# Patient Record
Sex: FEMALE | Race: WHITE | NOT HISPANIC OR LATINO | Employment: OTHER | URBAN - METROPOLITAN AREA
[De-identification: names, ages, dates, MRNs, and addresses within clinical notes are randomized per-mention and may not be internally consistent; named-entity substitution may affect disease eponyms.]

---

## 2023-08-02 ENCOUNTER — OFFICE VISIT (OUTPATIENT)
Dept: PRIMARY CARE CLINIC | Facility: CLINIC | Age: 83
End: 2023-08-02
Payer: MEDICARE

## 2023-08-02 VITALS
HEIGHT: 65 IN | WEIGHT: 159 LBS | OXYGEN SATURATION: 98 % | HEART RATE: 76 BPM | SYSTOLIC BLOOD PRESSURE: 110 MMHG | BODY MASS INDEX: 26.49 KG/M2 | TEMPERATURE: 98 F | RESPIRATION RATE: 18 BRPM | DIASTOLIC BLOOD PRESSURE: 70 MMHG

## 2023-08-02 DIAGNOSIS — Z85.3 HISTORY OF RIGHT BREAST CANCER: ICD-10-CM

## 2023-08-02 DIAGNOSIS — L72.0 EPIDERMAL INCLUSION CYST: ICD-10-CM

## 2023-08-02 DIAGNOSIS — Z76.89 ENCOUNTER TO ESTABLISH CARE WITH NEW DOCTOR: Primary | ICD-10-CM

## 2023-08-02 DIAGNOSIS — N32.81 OAB (OVERACTIVE BLADDER): ICD-10-CM

## 2023-08-02 PROCEDURE — 99204 PR OFFICE/OUTPT VISIT, NEW, LEVL IV, 45-59 MIN: ICD-10-PCS | Mod: ,,, | Performed by: STUDENT IN AN ORGANIZED HEALTH CARE EDUCATION/TRAINING PROGRAM

## 2023-08-02 PROCEDURE — 99204 OFFICE O/P NEW MOD 45 MIN: CPT | Mod: ,,, | Performed by: STUDENT IN AN ORGANIZED HEALTH CARE EDUCATION/TRAINING PROGRAM

## 2023-08-02 RX ORDER — FLUTICASONE PROPIONATE AND SALMETEROL 100; 50 UG/1; UG/1
1 POWDER RESPIRATORY (INHALATION) 2 TIMES DAILY
COMMUNITY
End: 2023-11-16 | Stop reason: SDUPTHER

## 2023-08-02 RX ORDER — ATORVASTATIN CALCIUM 10 MG/1
10 TABLET, FILM COATED ORAL NIGHTLY
COMMUNITY
Start: 2023-07-12 | End: 2024-01-11 | Stop reason: SDUPTHER

## 2023-08-02 RX ORDER — LORATADINE 10 MG/1
10 TABLET ORAL DAILY
COMMUNITY

## 2023-08-02 RX ORDER — FLUTICASONE PROPIONATE 50 MCG
2 SPRAY, SUSPENSION (ML) NASAL DAILY
COMMUNITY

## 2023-08-02 RX ORDER — MONTELUKAST SODIUM 10 MG/1
10 TABLET ORAL DAILY
COMMUNITY
Start: 2023-07-26 | End: 2023-10-05 | Stop reason: SDUPTHER

## 2023-08-02 RX ORDER — DONEPEZIL HYDROCHLORIDE 10 MG/1
10 TABLET, FILM COATED ORAL NIGHTLY
COMMUNITY
Start: 2023-05-25 | End: 2023-10-26 | Stop reason: SDUPTHER

## 2023-08-02 RX ORDER — DULOXETIN HYDROCHLORIDE 60 MG/1
60 CAPSULE, DELAYED RELEASE ORAL
COMMUNITY
Start: 2023-07-27 | End: 2023-10-26 | Stop reason: SDUPTHER

## 2023-08-02 RX ORDER — METOPROLOL SUCCINATE 50 MG/1
50 TABLET, EXTENDED RELEASE ORAL DAILY
COMMUNITY
Start: 2023-07-27

## 2023-08-02 RX ORDER — MESALAMINE 800 MG/1
800 TABLET, DELAYED RELEASE ORAL 2 TIMES DAILY
COMMUNITY
Start: 2023-07-20 | End: 2023-08-17 | Stop reason: SDUPTHER

## 2023-08-02 RX ORDER — OMEPRAZOLE 40 MG/1
40 CAPSULE, DELAYED RELEASE ORAL EVERY MORNING
COMMUNITY
Start: 2023-03-14 | End: 2023-08-02 | Stop reason: ALTCHOICE

## 2023-08-02 RX ORDER — MIRABEGRON 50 MG/1
1 TABLET, FILM COATED, EXTENDED RELEASE ORAL DAILY
COMMUNITY
Start: 2023-05-25

## 2023-08-02 NOTE — PROGRESS NOTES
"Chief Complaint  Chief Complaint   Patient presents with    [facility]    Check lesion Right upper back    overactive bladder     Would like a urology referral       HPI  Brennan Lovett is a 83 y.o. female with medical diagnoses as listed in the medical history and problem list that presents to [facility]. Patient just moved from [facility] to live with her daughter. Medical histories and medications reviewed. At baseline, patient came ambulate independently, cooks and performs ADLs. She no long drives for a long time. She used to follow up with dermatology, GI and urology before the move.   Acute complaints:   - Colitis: 2-3 soft DM daily due to "colitis" that she is taking mesalamin for. The frequency has not changed.   - A small bump on her upper back for unknown duration. The lesion does not cause pain or drainage but patient desires to have it removed.   - Frequent noctural urination: patient saw urologist once and was prescribed Myrbetriq but medication does not work. Patient still gets up multiple times during the night. She does not drink excessive fluid after dinner.     Health Maintenance         Date Due Completion Date    Lipid Panel Never done ---    DEXA Scan Never done ---    Shingles Vaccine (1 of 2) Never done ---    Pneumococcal Vaccines (Age 65+) (1 - PCV) Never done ---    COVID-19 Vaccine (2 - Mixed Product series) 03/08/2022 1/11/2022    Influenza Vaccine (1) 09/01/2023 9/23/2022    TETANUS VACCINE 08/13/2028 8/13/2018            ALLERGIES AND MEDICATIONS: updated and reviewed.  Review of patient's allergies indicates:   Allergen Reactions    Sulfa (sulfonamide antibiotics) Hives     Current Outpatient Medications   Medication Sig Dispense Refill    atorvastatin (LIPITOR) 10 MG tablet Take 10 mg by mouth every evening.      donepeziL (ARICEPT) 10 MG tablet Take 10 mg by mouth every evening.      DULoxetine (CYMBALTA) 60 MG capsule Take 60 mg by mouth.      fluticasone propionate " "(FLONASE) 50 mcg/actuation nasal spray 2 sprays by Each Nostril route once daily.      fluticasone-salmeterol diskus inhaler 100-50 mcg Inhale 1 puff into the lungs 2 (two) times daily. Controller      loratadine (CLARITIN) 10 mg tablet Take 10 mg by mouth once daily.      mesalamine (ASACOL) 800 mg TbEC Take 800 mg by mouth 2 (two) times daily.      metoprolol succinate (TOPROL-XL) 50 MG 24 hr tablet Take 50 mg by mouth Daily.      montelukast (SINGULAIR) 10 mg tablet Take 10 mg by mouth once daily.      MULTIVITAMIN ORAL Take 1 tablet by mouth Daily.      MYRBETRIQ 50 mg Tb24 Take 1 tablet by mouth Daily.      UNABLE TO FIND Take 1 tablet by mouth 2 (two) times a day. Perservere Reds       No current facility-administered medications for this visit.       Histories are reviewed and updated as appropriate     Review of Systems  Comprehensive review of system performed- negative except noted in HPI       Objective:   Vitals:    08/02/23 1259   BP: 110/70   BP Location: Left arm   Patient Position: Sitting   BP Method: Large (Manual)   Pulse: 76   Resp: 18   Temp: 98.3 °F (36.8 °C)   TempSrc: Oral   SpO2: 98%   Weight: 72.1 kg (159 lb)   Height: 5' 5" (1.651 m)    Body mass index is 26.46 kg/m².  Physical Exam  Vitals and nursing note reviewed.   Constitutional:       General: She is not in acute distress.     Appearance: Normal appearance.   HENT:      Head: Normocephalic and atraumatic.      Mouth/Throat:      Mouth: Mucous membranes are moist.      Pharynx: Oropharynx is clear.   Eyes:      Extraocular Movements: Extraocular movements intact.      Conjunctiva/sclera: Conjunctivae normal.   Cardiovascular:      Rate and Rhythm: Normal rate and regular rhythm.   Pulmonary:      Effort: Pulmonary effort is normal.      Breath sounds: Normal breath sounds.   Abdominal:      General: Abdomen is flat.      Palpations: Abdomen is soft.   Musculoskeletal:         General: No tenderness, deformity or signs of injury. " Normal range of motion.      Cervical back: Normal range of motion.   Skin:     General: Skin is warm and dry.      Comments: Thin and fragile skin.   Small inclusion cyst on back with small opening and black color center.    Neurological:      General: No focal deficit present.      Mental Status: She is alert and oriented to person, place, and time. Mental status is at baseline.      Motor: No weakness.      Gait: Gait normal.   Psychiatric:         Mood and Affect: Mood normal.         Behavior: Behavior normal.           Assessment & Plan  1. Encounter to establish care with new doctor  Pending record for last wellness exam     2. OAB (overactive bladder)  Overview:  Patient was seeing an urologist in [place] and prescribed Myrbetriq but symptoms are unchanged.   Requests referral for urology in [facility]     Orders:  -     Ambulatory referral/consult to Urology; Future; Expected date: 08/09/2023    3. History of breast cancer in remission.   Patient has MMG annually since and last exam was 09/2022, requests order for this year     4. Inclusion cyst:   RTC in 2 weeks for inclusion cyst removal

## 2023-08-16 ENCOUNTER — OFFICE VISIT (OUTPATIENT)
Dept: PRIMARY CARE CLINIC | Facility: CLINIC | Age: 83
End: 2023-08-16
Payer: MEDICARE

## 2023-08-16 VITALS
HEIGHT: 65 IN | WEIGHT: 158 LBS | SYSTOLIC BLOOD PRESSURE: 106 MMHG | RESPIRATION RATE: 18 BRPM | BODY MASS INDEX: 26.33 KG/M2 | DIASTOLIC BLOOD PRESSURE: 64 MMHG | HEART RATE: 78 BPM | OXYGEN SATURATION: 99 % | TEMPERATURE: 98 F

## 2023-08-16 DIAGNOSIS — L72.0 EPIDERMAL INCLUSION CYST: Primary | ICD-10-CM

## 2023-08-16 PROCEDURE — 99499 NO LOS: ICD-10-PCS | Mod: ,,, | Performed by: STUDENT IN AN ORGANIZED HEALTH CARE EDUCATION/TRAINING PROGRAM

## 2023-08-16 PROCEDURE — 11400 EXC TR-EXT B9+MARG 0.5 CM<: CPT | Mod: ,,, | Performed by: STUDENT IN AN ORGANIZED HEALTH CARE EDUCATION/TRAINING PROGRAM

## 2023-08-16 PROCEDURE — 99499 UNLISTED E&M SERVICE: CPT | Mod: ,,, | Performed by: STUDENT IN AN ORGANIZED HEALTH CARE EDUCATION/TRAINING PROGRAM

## 2023-08-16 PROCEDURE — 11400: ICD-10-PCS | Mod: ,,, | Performed by: STUDENT IN AN ORGANIZED HEALTH CARE EDUCATION/TRAINING PROGRAM

## 2023-08-16 NOTE — PROGRESS NOTES
Chief Complaint  Chief Complaint   Patient presents with    Procedure     Cyst removal from back        HPI  Brennan Lovett is a 83 y.o. female with medical diagnoses as listed in the medical history and problem list that presents for scheduled inclusion cyst removal on her back     Health Maintenance         Date Due Completion Date    Lipid Panel Never done ---    DEXA Scan Never done ---    Shingles Vaccine (1 of 2) Never done ---    Pneumococcal Vaccines (Age 65+) (1 - PCV) Never done ---    COVID-19 Vaccine (2 - Mixed Product series) 03/08/2022 1/11/2022    Influenza Vaccine (1) 09/01/2023 9/23/2022    TETANUS VACCINE 08/13/2028 8/13/2018            ALLERGIES AND MEDICATIONS: updated and reviewed.  Review of patient's allergies indicates:   Allergen Reactions    Sulfa (sulfonamide antibiotics) Hives     Current Outpatient Medications   Medication Sig Dispense Refill    atorvastatin (LIPITOR) 10 MG tablet Take 10 mg by mouth every evening.      donepeziL (ARICEPT) 10 MG tablet Take 10 mg by mouth every evening.      DULoxetine (CYMBALTA) 60 MG capsule Take 60 mg by mouth.      fluticasone propionate (FLONASE) 50 mcg/actuation nasal spray 2 sprays by Each Nostril route once daily.      fluticasone-salmeterol diskus inhaler 100-50 mcg Inhale 1 puff into the lungs 2 (two) times daily. Controller      loratadine (CLARITIN) 10 mg tablet Take 10 mg by mouth once daily.      mesalamine (ASACOL) 800 mg TbEC Take 800 mg by mouth 2 (two) times daily.      metoprolol succinate (TOPROL-XL) 50 MG 24 hr tablet Take 50 mg by mouth Daily.      montelukast (SINGULAIR) 10 mg tablet Take 10 mg by mouth once daily.      MULTIVITAMIN ORAL Take 1 tablet by mouth Daily.      MYRBETRIQ 50 mg Tb24 Take 1 tablet by mouth Daily.      UNABLE TO FIND Take 1 tablet by mouth 2 (two) times a day. Perservere Reds       No current facility-administered medications for this visit.       Histories are reviewed and updated as appropriate     Review of  "Systems  Comprehensive review of system performed- negative except noted in HPI       Objective:   Vitals:    08/16/23 0922   BP: 106/64   BP Location: Left arm   Patient Position: Sitting   BP Method: Large (Manual)   Pulse: 78   Resp: 18   Temp: 98.4 °F (36.9 °C)   TempSrc: Oral   SpO2: 99%   Weight: 71.7 kg (158 lb)   Height: 5' 5" (1.651 m)    Body mass index is 26.29 kg/m².  Physical Exam  Vitals and nursing note reviewed.   Constitutional:       General: She is not in acute distress.     Appearance: Normal appearance.   HENT:      Head: Normocephalic and atraumatic.   Cardiovascular:      Rate and Rhythm: Normal rate and regular rhythm.   Pulmonary:      Effort: Pulmonary effort is normal.   Musculoskeletal:      Cervical back: Normal range of motion.   Skin:     Findings: Lesion present.   Neurological:      General: No focal deficit present.      Mental Status: She is alert and oriented to person, place, and time.           Assessment & Plan  1. Epidermal inclusion cyst  -     Excision of epidermal inclusion cyst     See procedure note       "

## 2023-08-16 NOTE — PROCEDURES
Excision of epidermal inclusion cyst    Date/Time: 8/16/2023 9:30 AM    Performed by: Anh Ovalle MD  Authorized by: Anh Ovalle MD    Consent Done?:  Yes (Written)  Timeout: prior to procedure the correct patient, procedure, and site was verified    Local anesthesia used?: Yes    Anesthesia:  Local infiltration  Local anesthetic:  Lidocaine 2% with epinephrine  Anesthetic total (ml):  5  Assistants?: No    List of assistants:  Lorraine Juan  Indications:  Cyst  Body area:  Back / flank  Laterality:  Right  Position:  Lateral  Anesthesia:  Local infiltration  Local anesthetic:  Lidocaine 2% with epinephrine  Excision type:  Skin  Malignancy:  Benign  Excision size (cm):  0.5  Scalpel size:  11  Incision type:  Single straight  Specimens?: No     Hemostasis was obtained.  Estimated blood loss (cc):  1  Wound closure:  Simple  Wound repair size (cm):  0.5  Sterile dressings:  Steri-strips  Post-op diagnosis:  Same as pre-op diagnosis.   Patient tolerated the procedure well with no immediate complications.

## 2023-08-17 ENCOUNTER — TELEPHONE (OUTPATIENT)
Dept: PRIMARY CARE CLINIC | Facility: CLINIC | Age: 83
End: 2023-08-17
Payer: MEDICARE

## 2023-08-17 DIAGNOSIS — K52.89 OTHER SPECIFIED NONINFECTIVE GASTROENTERITIS AND COLITIS: Primary | ICD-10-CM

## 2023-08-17 RX ORDER — MESALAMINE 800 MG/1
800 TABLET, DELAYED RELEASE ORAL 2 TIMES DAILY
Qty: 180 TABLET | Refills: 3 | Status: SHIPPED | OUTPATIENT
Start: 2023-08-17 | End: 2024-08-16

## 2023-08-17 NOTE — TELEPHONE ENCOUNTER
----- Message from Jes Mathews sent at 8/17/2023  8:14 AM CDT -----  Regarding: refill  .Type:  RX Refill Request    Who Called: pt  Refill or New Rx:refill  RX Name and Strength:mesalamine (ASACOL) 800 mg   How is the patient currently taking it? (ex. 1XDay):Take 800 mg by mouth 2 (two) times daily.  Is this a 30 day or 90 day RX:  [facility] phone number[facility]  Local or Mail Order:local  Ordering Provider:  Would the patient rather a call back or a response via [facility]?   Best Call Back Number:  Additional Information:

## 2023-10-05 ENCOUNTER — TELEPHONE (OUTPATIENT)
Dept: PRIMARY CARE CLINIC | Facility: CLINIC | Age: 83
End: 2023-10-05
Payer: MEDICARE

## 2023-10-05 DIAGNOSIS — J45.20 MILD INTERMITTENT ASTHMA, UNCOMPLICATED: Primary | ICD-10-CM

## 2023-10-05 RX ORDER — MONTELUKAST SODIUM 10 MG/1
10 TABLET ORAL DAILY
Qty: 90 TABLET | Refills: 3 | Status: SHIPPED | OUTPATIENT
Start: 2023-10-05 | End: 2024-10-04

## 2023-10-05 NOTE — TELEPHONE ENCOUNTER
----- Message from Raysa Wright sent at 10/5/2023 12:34 PM CDT -----  Regarding: refill  .Type:  RX Refill Request    Who Called:   Refill or New Rx:refill  RX Name and Strength:montelukast (SINGULAIR) 10 mg tablet  How is the patient currently taking it? (ex. 1XDay):1xday  Is this a 30 day or 90 day RX:90  [facility] phone [email or web address]  Local or Mail Order:  Ordering Provider:  Would the patient rather a call back or a response via [facility]?   Best Call Back Number:[phone]  Additional Information:

## 2023-10-26 ENCOUNTER — TELEPHONE (OUTPATIENT)
Dept: PRIMARY CARE CLINIC | Facility: CLINIC | Age: 83
End: 2023-10-26
Payer: MEDICARE

## 2023-10-26 DIAGNOSIS — I10 PRIMARY HYPERTENSION: ICD-10-CM

## 2023-10-26 DIAGNOSIS — F32.A DEPRESSION, UNSPECIFIED DEPRESSION TYPE: ICD-10-CM

## 2023-10-26 DIAGNOSIS — F41.9 ANXIETY: Primary | ICD-10-CM

## 2023-10-26 RX ORDER — DONEPEZIL HYDROCHLORIDE 10 MG/1
10 TABLET, FILM COATED ORAL NIGHTLY
Qty: 90 TABLET | Refills: 3 | Status: SHIPPED | OUTPATIENT
Start: 2023-10-26

## 2023-10-26 RX ORDER — DULOXETIN HYDROCHLORIDE 60 MG/1
60 CAPSULE, DELAYED RELEASE ORAL DAILY
Qty: 90 CAPSULE | Refills: 3 | Status: SHIPPED | OUTPATIENT
Start: 2023-10-26

## 2023-10-26 NOTE — TELEPHONE ENCOUNTER
----- Message from Raysa Wright sent at 10/26/2023 12:37 PM CDT -----  Regarding: refill  .Type:  RX Refill Request    Who Called: evy  Refill or New Rx:refill  RX Name and Strength:donepeziL (ARICEPT) 10 MG tablet  How is the patient currently taking it? (ex. 1XDay):1xday  Is this a 30 day or 90 day RX:90  Refill or New Rx:refillDULoxetine (CYMBALTA) 60 MG capsule  RX Name and Strength:  How is the patient currently taking it? (ex. 1XDay):1xday  Is this a 30 day or 90 day RX:90 day    Preferred Pharmacy with phone number:[email or web address]  Local or Mail Order:local  Ordering Provider:  Would the patient rather a call back or a response via [facility]? [facility]  Best Call Back Number:[phone]   Additional Information:

## 2023-11-16 ENCOUNTER — OFFICE VISIT (OUTPATIENT)
Dept: PRIMARY CARE CLINIC | Facility: CLINIC | Age: 83
End: 2023-11-16
Payer: MEDICARE

## 2023-11-16 VITALS
RESPIRATION RATE: 18 BRPM | HEART RATE: 68 BPM | DIASTOLIC BLOOD PRESSURE: 80 MMHG | TEMPERATURE: 97 F | HEIGHT: 65 IN | BODY MASS INDEX: 25.99 KG/M2 | WEIGHT: 156 LBS | OXYGEN SATURATION: 98 % | SYSTOLIC BLOOD PRESSURE: 120 MMHG

## 2023-11-16 DIAGNOSIS — G30.1 MODERATE LATE ONSET ALZHEIMER'S DEMENTIA WITHOUT BEHAVIORAL DISTURBANCE, PSYCHOTIC DISTURBANCE, MOOD DISTURBANCE, OR ANXIETY: ICD-10-CM

## 2023-11-16 DIAGNOSIS — Z78.0 POST-MENOPAUSE: ICD-10-CM

## 2023-11-16 DIAGNOSIS — J44.9 CHRONIC OBSTRUCTIVE PULMONARY DISEASE, UNSPECIFIED COPD TYPE: ICD-10-CM

## 2023-11-16 DIAGNOSIS — Z00.00 MEDICARE ANNUAL WELLNESS VISIT, SUBSEQUENT: ICD-10-CM

## 2023-11-16 DIAGNOSIS — Z12.31 BREAST CANCER SCREENING BY MAMMOGRAM: ICD-10-CM

## 2023-11-16 DIAGNOSIS — J45.20 MILD INTERMITTENT ASTHMA, UNCOMPLICATED: Primary | ICD-10-CM

## 2023-11-16 DIAGNOSIS — F02.B0 MODERATE LATE ONSET ALZHEIMER'S DEMENTIA WITHOUT BEHAVIORAL DISTURBANCE, PSYCHOTIC DISTURBANCE, MOOD DISTURBANCE, OR ANXIETY: ICD-10-CM

## 2023-11-16 DIAGNOSIS — E78.2 MIXED HYPERLIPIDEMIA: ICD-10-CM

## 2023-11-16 LAB
ALBUMIN SERPL-MCNC: 4 G/DL (ref 3.4–4.8)
ALBUMIN/GLOB SERPL: 1.3 RATIO (ref 1.1–2)
ALP SERPL-CCNC: 63 UNIT/L (ref 40–150)
ALT SERPL-CCNC: 21 UNIT/L (ref 0–55)
AST SERPL-CCNC: 23 UNIT/L (ref 5–34)
BASOPHILS # BLD AUTO: 0.02 X10(3)/MCL
BASOPHILS NFR BLD AUTO: 0.3 %
BILIRUB SERPL-MCNC: 0.7 MG/DL
BUN SERPL-MCNC: 17.7 MG/DL (ref 9.8–20.1)
CALCIUM SERPL-MCNC: 10.4 MG/DL (ref 8.4–10.2)
CHLORIDE SERPL-SCNC: 105 MMOL/L (ref 98–107)
CHOLEST SERPL-MCNC: 158 MG/DL
CHOLEST/HDLC SERPL: 3 {RATIO} (ref 0–5)
CO2 SERPL-SCNC: 30 MMOL/L (ref 23–31)
CREAT SERPL-MCNC: 1.18 MG/DL (ref 0.55–1.02)
EOSINOPHIL # BLD AUTO: 0.12 X10(3)/MCL (ref 0–0.9)
EOSINOPHIL NFR BLD AUTO: 2 %
ERYTHROCYTE [DISTWIDTH] IN BLOOD BY AUTOMATED COUNT: 12.9 % (ref 11.5–17)
GFR SERPLBLD CREATININE-BSD FMLA CKD-EPI: 46 MLS/MIN/1.73/M2
GLOBULIN SER-MCNC: 3.2 GM/DL (ref 2.4–3.5)
GLUCOSE SERPL-MCNC: 111 MG/DL (ref 82–115)
HCT VFR BLD AUTO: 49.3 % (ref 37–47)
HDLC SERPL-MCNC: 55 MG/DL (ref 35–60)
HGB BLD-MCNC: 16 G/DL (ref 12–16)
IMM GRANULOCYTES # BLD AUTO: 0.01 X10(3)/MCL (ref 0–0.04)
IMM GRANULOCYTES NFR BLD AUTO: 0.2 %
LDLC SERPL CALC-MCNC: 82 MG/DL (ref 50–140)
LYMPHOCYTES # BLD AUTO: 1.64 X10(3)/MCL (ref 0.6–4.6)
LYMPHOCYTES NFR BLD AUTO: 27.4 %
MCH RBC QN AUTO: 33.5 PG (ref 27–31)
MCHC RBC AUTO-ENTMCNC: 32.5 G/DL (ref 33–36)
MCV RBC AUTO: 103.4 FL (ref 80–94)
MONOCYTES # BLD AUTO: 0.58 X10(3)/MCL (ref 0.1–1.3)
MONOCYTES NFR BLD AUTO: 9.7 %
NEUTROPHILS # BLD AUTO: 3.61 X10(3)/MCL (ref 2.1–9.2)
NEUTROPHILS NFR BLD AUTO: 60.4 %
NRBC BLD AUTO-RTO: 0 %
PLATELET # BLD AUTO: 199 X10(3)/MCL (ref 130–400)
PMV BLD AUTO: 10.9 FL (ref 7.4–10.4)
POTASSIUM SERPL-SCNC: 4.5 MMOL/L (ref 3.5–5.1)
PROT SERPL-MCNC: 7.2 GM/DL (ref 5.8–7.6)
RBC # BLD AUTO: 4.77 X10(6)/MCL (ref 4.2–5.4)
SODIUM SERPL-SCNC: 142 MMOL/L (ref 136–145)
TRIGL SERPL-MCNC: 106 MG/DL (ref 37–140)
TSH SERPL-ACNC: 3.27 UIU/ML (ref 0.35–4.94)
VLDLC SERPL CALC-MCNC: 21 MG/DL
WBC # SPEC AUTO: 5.98 X10(3)/MCL (ref 4.5–11.5)

## 2023-11-16 PROCEDURE — 99214 PR OFFICE/OUTPT VISIT, EST, LEVL IV, 30-39 MIN: ICD-10-PCS | Mod: ,,, | Performed by: STUDENT IN AN ORGANIZED HEALTH CARE EDUCATION/TRAINING PROGRAM

## 2023-11-16 PROCEDURE — 84443 ASSAY THYROID STIM HORMONE: CPT | Performed by: STUDENT IN AN ORGANIZED HEALTH CARE EDUCATION/TRAINING PROGRAM

## 2023-11-16 PROCEDURE — 36415 COLL VENOUS BLD VENIPUNCTURE: CPT | Mod: ,,, | Performed by: STUDENT IN AN ORGANIZED HEALTH CARE EDUCATION/TRAINING PROGRAM

## 2023-11-16 PROCEDURE — 80053 COMPREHEN METABOLIC PANEL: CPT | Performed by: STUDENT IN AN ORGANIZED HEALTH CARE EDUCATION/TRAINING PROGRAM

## 2023-11-16 PROCEDURE — 85025 COMPLETE CBC W/AUTO DIFF WBC: CPT | Performed by: STUDENT IN AN ORGANIZED HEALTH CARE EDUCATION/TRAINING PROGRAM

## 2023-11-16 PROCEDURE — 99214 OFFICE O/P EST MOD 30 MIN: CPT | Mod: ,,, | Performed by: STUDENT IN AN ORGANIZED HEALTH CARE EDUCATION/TRAINING PROGRAM

## 2023-11-16 PROCEDURE — 80061 LIPID PANEL: CPT | Performed by: STUDENT IN AN ORGANIZED HEALTH CARE EDUCATION/TRAINING PROGRAM

## 2023-11-16 PROCEDURE — 36415 COLL VENOUS BLD VENIPUNCTURE: CPT | Performed by: STUDENT IN AN ORGANIZED HEALTH CARE EDUCATION/TRAINING PROGRAM

## 2023-11-16 PROCEDURE — 36415 PR COLLECTION VENOUS BLOOD,VENIPUNCTURE: ICD-10-PCS | Mod: ,,, | Performed by: STUDENT IN AN ORGANIZED HEALTH CARE EDUCATION/TRAINING PROGRAM

## 2023-11-16 RX ORDER — FLUTICASONE PROPIONATE AND SALMETEROL 100; 50 UG/1; UG/1
1 POWDER RESPIRATORY (INHALATION) 2 TIMES DAILY
Qty: 60 EACH | Refills: 3 | Status: SHIPPED | OUTPATIENT
Start: 2023-11-16

## 2023-11-16 NOTE — PROGRESS NOTES
Chief Complaint  No chief complaint on file.      HPI  Brennan Lovett is a 83 y.o. female with medical diagnoses as listed in the medical history and problem list that presents for chronic conditions follow up. Patient denies any falls or health event since last visit. Activity level is unchanged. Refills needed.   S/p cyst removal upper back last visit by  me 8/2023- doing well without any scar or new lesion.   Denies other complaints    Health Maintenance         Date Due Completion Date    Lipid Panel Never done ---    DEXA Scan Never done ---    Shingles Vaccine (1 of 2) Never done ---    RSV Vaccine (Age 60+) (1 - 1-dose 60+ series) Never done ---    Pneumococcal Vaccines (Age 65+) (1 - PCV) Never done ---    COVID-19 Vaccine (2 - 2023-24 season) 09/01/2023 1/11/2022    TETANUS VACCINE 08/13/2028 8/13/2018            ALLERGIES AND MEDICATIONS: updated and reviewed.  Review of patient's allergies indicates:   Allergen Reactions    Sulfa (sulfonamide antibiotics) Hives     Current Outpatient Medications   Medication Sig Dispense Refill    atorvastatin (LIPITOR) 10 MG tablet Take 10 mg by mouth every evening.      donepeziL (ARICEPT) 10 MG tablet Take 1 tablet (10 mg total) by mouth every evening. 90 tablet 3    DULoxetine (CYMBALTA) 60 MG capsule Take 1 capsule (60 mg total) by mouth once daily. 90 capsule 3    fluticasone propionate (FLONASE) 50 mcg/actuation nasal spray 2 sprays by Each Nostril route once daily.      loratadine (CLARITIN) 10 mg tablet Take 10 mg by mouth once daily.      mesalamine (ASACOL) 800 mg TbEC Take 1 tablet (800 mg total) by mouth 2 (two) times daily. 180 tablet 3    metoprolol succinate (TOPROL-XL) 50 MG 24 hr tablet Take 50 mg by mouth Daily.      montelukast (SINGULAIR) 10 mg tablet Take 1 tablet (10 mg total) by mouth once daily. 90 tablet 3    MULTIVITAMIN ORAL Take 1 tablet by mouth Daily.      MYRBETRIQ 50 mg Tb24 Take 1 tablet by mouth Daily.      UNABLE TO FIND Take 1 tablet by  "mouth 2 (two) times a day. Perservere Reds      fluticasone-salmeterol diskus inhaler 100-50 mcg Inhale 1 puff into the lungs 2 (two) times daily. Controller 60 each 3     No current facility-administered medications for this visit.       Histories are reviewed and updated as appropriate     Review of Systems  Comprehensive review of system performed- negative except noted in HPI       Objective:   Vitals:    11/16/23 0836   BP: 120/80   Pulse: 68   Resp: 18   Temp: 97.3 °F (36.3 °C)   TempSrc: Oral   SpO2: 98%   Weight: 70.8 kg (156 lb)   Height: 5' 5" (1.651 m)    Body mass index is 25.96 kg/m².  Physical Exam  Vitals and nursing note reviewed.   Constitutional:       General: She is not in acute distress.     Appearance: Normal appearance.   HENT:      Head: Normocephalic and atraumatic.      Mouth/Throat:      Mouth: Mucous membranes are moist.      Pharynx: Oropharynx is clear.   Eyes:      Extraocular Movements: Extraocular movements intact.      Conjunctiva/sclera: Conjunctivae normal.   Cardiovascular:      Rate and Rhythm: Normal rate and regular rhythm.   Pulmonary:      Effort: Pulmonary effort is normal.      Breath sounds: Normal breath sounds.   Abdominal:      General: There is no distension.      Palpations: Abdomen is soft.      Tenderness: There is no abdominal tenderness.   Musculoskeletal:         General: No swelling or deformity. Normal range of motion.   Skin:     General: Skin is warm and dry.   Neurological:      General: No focal deficit present.      Mental Status: She is alert and oriented to person, place, and time. Mental status is at baseline.      Motor: No weakness.      Gait: Gait normal.   Psychiatric:         Mood and Affect: Mood normal.         Behavior: Behavior normal.           Assessment & Plan  1. Mild intermittent asthma, uncomplicated  -     fluticasone-salmeterol diskus inhaler 100-50 mcg; Inhale 1 puff into the lungs 2 (two) times daily. Controller  Dispense: 60 each; " Refill: 3    2. Chronic obstructive pulmonary disease, unspecified COPD type  Stable on Advair     3. Moderate late onset Alzheimer's dementia without behavioral disturbance, psychotic disturbance, mood disturbance, or anxiety  Continue to monitor   Patient is appropriate for her age.     RTC in 2-3 months for wellness     I spent a total of 35 minutes on the day of the visit.This includes face to face time and non-face to face time preparing to see the patient (eg, review of tests), obtaining and/or reviewing separately obtained history, documenting clinical information in the electronic or other health record, independently interpreting results and communicating results to the patient/family/caregiver, or care coordinator.

## 2023-11-17 LAB
APPEARANCE UR: CLEAR
BACTERIA #/AREA URNS AUTO: ABNORMAL /HPF
BILIRUB UR QL STRIP.AUTO: NEGATIVE
COLOR UR AUTO: COLORLESS
GLUCOSE UR QL STRIP.AUTO: NORMAL
KETONES UR QL STRIP.AUTO: NEGATIVE
LEUKOCYTE ESTERASE UR QL STRIP.AUTO: NEGATIVE
MUCOUS THREADS URNS QL MICRO: ABNORMAL /LPF
NITRITE UR QL STRIP.AUTO: NEGATIVE
PH UR STRIP.AUTO: 6.5 [PH]
PROT UR QL STRIP.AUTO: NEGATIVE
RBC #/AREA URNS AUTO: ABNORMAL /HPF
RBC UR QL AUTO: NEGATIVE
SP GR UR STRIP.AUTO: 1.01 (ref 1–1.03)
SQUAMOUS #/AREA URNS LPF: ABNORMAL /HPF
UROBILINOGEN UR STRIP-ACNC: NORMAL
WBC #/AREA URNS AUTO: ABNORMAL /HPF

## 2023-12-11 ENCOUNTER — TELEPHONE (OUTPATIENT)
Dept: PRIMARY CARE CLINIC | Facility: CLINIC | Age: 83
End: 2023-12-11
Payer: MEDICARE

## 2023-12-11 RX ORDER — NYSTATIN 100000 U/G
CREAM TOPICAL 2 TIMES DAILY
Qty: 60 G | Refills: 1 | Status: SHIPPED | OUTPATIENT
Start: 2023-12-11

## 2023-12-11 NOTE — TELEPHONE ENCOUNTER
----- Message from Kimberlyn Harvey LPN sent at 12/11/2023 11:31 AM CST -----  Regarding: FW: call    ----- Message -----  From: Jes Mathews  Sent: 12/11/2023  11:11 AM CST  To: [facility] Staff  Subject: call                                             .Type:  Needs Medical Advice    Who Called: pt- daughter  Symptoms (please be specific): rash    How long has patient had these symptoms:  couple of days  Pharmacy name and phone #:    Would the patient rather a call back or a response via [facility]?   Best Call Back Number:  [phone]  Additional Information: rash under breast - request call back

## 2023-12-11 NOTE — TELEPHONE ENCOUNTER
I spoke to Ms. Cinthya, patient's daughter, she develops a yeast like rash under her breast every year. It usually responds well to antifungal cream.

## 2023-12-20 ENCOUNTER — OFFICE VISIT (OUTPATIENT)
Dept: PRIMARY CARE CLINIC | Facility: CLINIC | Age: 83
End: 2023-12-20
Payer: MEDICARE

## 2023-12-20 VITALS
SYSTOLIC BLOOD PRESSURE: 116 MMHG | HEART RATE: 68 BPM | RESPIRATION RATE: 18 BRPM | BODY MASS INDEX: 25.83 KG/M2 | HEIGHT: 65 IN | TEMPERATURE: 97 F | WEIGHT: 155 LBS | OXYGEN SATURATION: 98 % | DIASTOLIC BLOOD PRESSURE: 70 MMHG

## 2023-12-20 DIAGNOSIS — E78.5 HYPERLIPIDEMIA, UNSPECIFIED HYPERLIPIDEMIA TYPE: ICD-10-CM

## 2023-12-20 DIAGNOSIS — F03.A0 MILD DEMENTIA WITHOUT BEHAVIORAL DISTURBANCE, PSYCHOTIC DISTURBANCE, MOOD DISTURBANCE, OR ANXIETY, UNSPECIFIED DEMENTIA TYPE: ICD-10-CM

## 2023-12-20 DIAGNOSIS — F41.9 ANXIETY: ICD-10-CM

## 2023-12-20 DIAGNOSIS — Z00.00 MEDICARE ANNUAL WELLNESS VISIT, SUBSEQUENT: Primary | ICD-10-CM

## 2023-12-20 DIAGNOSIS — N32.81 OAB (OVERACTIVE BLADDER): ICD-10-CM

## 2023-12-20 DIAGNOSIS — J44.9 CHRONIC OBSTRUCTIVE PULMONARY DISEASE, UNSPECIFIED COPD TYPE: ICD-10-CM

## 2023-12-20 DIAGNOSIS — K52.89 OTHER SPECIFIED NONINFECTIVE GASTROENTERITIS AND COLITIS: ICD-10-CM

## 2023-12-20 DIAGNOSIS — I10 PRIMARY HYPERTENSION: ICD-10-CM

## 2023-12-20 DIAGNOSIS — L82.1 SEBORRHEIC KERATOSIS: ICD-10-CM

## 2023-12-20 PROCEDURE — G0439 PR MEDICARE ANNUAL WELLNESS SUBSEQUENT VISIT: ICD-10-PCS | Mod: ,,, | Performed by: STUDENT IN AN ORGANIZED HEALTH CARE EDUCATION/TRAINING PROGRAM

## 2023-12-20 PROCEDURE — G0439 PPPS, SUBSEQ VISIT: HCPCS | Mod: ,,, | Performed by: STUDENT IN AN ORGANIZED HEALTH CARE EDUCATION/TRAINING PROGRAM

## 2023-12-20 RX ORDER — VIBEGRON 75 MG/1
1 TABLET, FILM COATED ORAL DAILY
COMMUNITY
Start: 2023-12-13

## 2023-12-20 NOTE — PROGRESS NOTES
Patient ID: [id number]     Chief Complaint: Medicare AWV (Labs done )      HPI:     Brennan Lovett is a 83 y.o. female here today for a Medicare Wellness.     Patient lives with her daughter's family. She is independent in all ADLs. She denies falls or neglect. Feels safe at home. Regular visit with PCP and specialist. Patient is compliant with all her medications. Denies any new side effects.   Current and past medical history reviewed.  Pertinent family and social history reviewed.    Vaccinations due. vaccination status reviewed, if due and if desired, vaccinations provided and given     No complaints today.    Opioid Screening: Patient medication list reviewed, patient is not taking prescription opioids. Patient is not using additional opioids than prescribed. Patient is at low risk of substance abuse based on this opioid use history.     Patient has urine leakage and follows up with urology     ----------------------------  Allergy  Anxiety  Dementia  Depression  History of right breast cancer      Comment:  Stage 1 (does not carry the gene)  Hyperlipidemia  Hypertension  Mild intermittent asthma, uncomplicated  OAB (overactive bladder)  Other specified noninfective gastroenteritis and colitis     Past Surgical History:   Procedure Laterality Date    BACK SURGERY      BREAST LUMPECTOMY Right     CHOLECYSTECTOMY      HYSTERECTOMY         Review of patient's allergies indicates:   Allergen Reactions    Sulfa (sulfonamide antibiotics) Hives       Outpatient Medications Marked as Taking for the 12/20/23 encounter (Office Visit) with Anh Ovalle MD   Medication Sig Dispense Refill    atorvastatin (LIPITOR) 10 MG tablet Take 10 mg by mouth every evening.      donepeziL (ARICEPT) 10 MG tablet Take 1 tablet (10 mg total) by mouth every evening. 90 tablet 3    DULoxetine (CYMBALTA) 60 MG capsule Take 1 capsule (60 mg total) by mouth once daily. 90 capsule 3    fluticasone propionate (FLONASE) 50 mcg/actuation nasal  spray 2 sprays by Each Nostril route once daily.      fluticasone-salmeterol diskus inhaler 100-50 mcg Inhale 1 puff into the lungs 2 (two) times daily. Controller 60 each 3    GEMTESA 75 mg Tab Take 1 tablet by mouth Daily.      loratadine (CLARITIN) 10 mg tablet Take 10 mg by mouth once daily.      mesalamine (ASACOL) 800 mg TbEC Take 1 tablet (800 mg total) by mouth 2 (two) times daily. 180 tablet 3    metoprolol succinate (TOPROL-XL) 50 MG 24 hr tablet Take 50 mg by mouth Daily.      montelukast (SINGULAIR) 10 mg tablet Take 1 tablet (10 mg total) by mouth once daily. 90 tablet 3    MULTIVITAMIN ORAL Take 1 tablet by mouth Daily.      MYRBETRIQ 50 mg Tb24 Take 1 tablet by mouth Daily.      nystatin (MYCOSTATIN) cream Apply topically 2 (two) times daily. 60 g 1    UNABLE TO FIND Take 1 tablet by mouth 2 (two) times a day. Perservere Peggy         Social History     Socioeconomic History    Marital status: Single   Tobacco Use    Smoking status: Former     Types: Cigarettes    Smokeless tobacco: Never   Substance and Sexual Activity    Alcohol use: Not Currently    Drug use: Never    Sexual activity: Not Currently        Family History   Family history unknown: Yes        Patient Care Team:  Anh Ovalle MD as PCP - General (Family Medicine)       Subjective:     Review of Systems   Constitutional:  Negative for chills, fever and weight loss.   HENT:  Negative for hearing loss.    Eyes:  Negative for blurred vision and double vision.   Respiratory:  Negative for cough, shortness of breath and wheezing.    Cardiovascular:  Negative for chest pain, palpitations and leg swelling.   Gastrointestinal:  Negative for blood in stool, constipation, diarrhea, melena, nausea and vomiting.   Genitourinary:  Negative for dysuria, frequency and urgency.   Musculoskeletal:  Negative for falls.   Neurological:  Negative for dizziness, weakness and headaches.   Psychiatric/Behavioral:  Negative for depression, hallucinations  "and memory loss.          Patient Reported Health Risk Assessment       Objective:     /70 (BP Location: Right arm, Patient Position: Sitting, BP Method: Large (Manual))   Pulse 68   Temp 97.3 °F (36.3 °C) (Oral)   Resp 18   Ht 5' 5" (1.651 m)   Wt 70.3 kg (155 lb)   SpO2 98%   BMI 25.79 kg/m²     Physical Exam  Vitals and nursing note reviewed.   Constitutional:       General: She is not in acute distress.     Appearance: Normal appearance.   HENT:      Head: Normocephalic and atraumatic.      Mouth/Throat:      Mouth: Mucous membranes are moist.      Pharynx: Oropharynx is clear.   Eyes:      Extraocular Movements: Extraocular movements intact.      Conjunctiva/sclera: Conjunctivae normal.   Cardiovascular:      Rate and Rhythm: Normal rate and regular rhythm.   Pulmonary:      Effort: Pulmonary effort is normal.      Breath sounds: Normal breath sounds.   Abdominal:      General: There is no distension.      Palpations: Abdomen is soft.      Tenderness: There is no abdominal tenderness.   Musculoskeletal:         General: No swelling or deformity. Normal range of motion.   Skin:     General: Skin is warm and dry.      Comments: Several hyperpigmented molds on back- unsure if size is changing    Neurological:      General: No focal deficit present.      Mental Status: She is alert and oriented to person, place, and time. Mental status is at baseline.      Motor: No weakness.      Gait: Gait normal.   Psychiatric:         Mood and Affect: Mood normal.         Behavior: Behavior normal.                No data to display                  12/20/2023     3:30 PM 11/16/2023     9:15 AM 8/16/2023     9:30 AM 8/2/2023     1:00 PM   Fall Risk Assessment - Outpatient   Mobility Status Ambulatory Ambulatory Ambulatory Ambulatory   Number of falls 0 0 0 0   Identified as fall risk False False False False              Assessment/Plan:     1. Medicare annual wellness visit, subsequent  Overall health status was " reviewed   Good health habits reinforced   Appropriate recommendations and preventative care medical information provided, with annual wellness exam encouraged.     2. Chronic obstructive pulmonary disease, unspecified COPD type  Stable, activity endurance is unchanged     3. Primary hypertension  Overview:  Stable on metoprolol XL 50mg daily       4. Hyperlipidemia, unspecified hyperlipidemia type  Overview:  Stable on Lipitor 10mg daily       5. OAB (overactive bladder)  Overview:  Patient was seeing an urologist and symptoms are stable       6. Other specified noninfective gastroenteritis and colitis  Overview:  Stable on mesalamine daily       7. Anxiety  Overview:  Stable on Duloxetine 60mg daily       8. Mild dementia without behavioral disturbance, psychotic disturbance, mood disturbance, or anxiety, unspecified dementia type  Overview:  Stable on Aricept 10mg daily              Medicare Annual Wellness and Personalized Prevention Plan:   Fall Risk + Home Safety + Hearing Impairment + Depression Screen + Opioid and Substance Abuse Screening + Cognitive Impairment Screen + Health Risk Assessment all reviewed.     Health Maintenance Topics with due status: Not Due       Topic Last Completion Date    TETANUS VACCINE 08/13/2018    Lipid Panel 11/16/2023      The patient's Health Maintenance was reviewed and the following appears to be due at this time:   Health Maintenance Due   Topic Date Due    Pneumococcal Vaccines (Age 65+) (1 - PCV) Never done    DEXA Scan  Never done    Shingles Vaccine (1 of 2) Never done    RSV Vaccine (Age 60+ and Pregnant patients) (1 - 1-dose 60+ series) Never done    COVID-19 Vaccine (2 - 2023-24 season) 09/01/2023       Advance Care Planning     Date: 12/20/2023    [facility]  I engaged the patient in a voluntary conversation about advance care planning and we specifically addressed what the goals of care would be moving forward.  We did specifically address the patient's likely prognosis,  which is good .  We explored the patient's values and preferences for future care.  The patient endorses that what is most important right now is to focus on spending time at home, avoiding the hospital, and remaining as independent as possible    Accordingly, we have decided that the best plan to meet the patient's goals includes continuing with treatment    Patient has living will in place with family     I spent a total of 3 minutes engaging the patient in this advance care planning discussion.              RTC in 6 months. In addition to their scheduled follow up, the patient has also been instructed to follow up on as needed basis.

## 2024-01-11 ENCOUNTER — TELEPHONE (OUTPATIENT)
Dept: PRIMARY CARE CLINIC | Facility: CLINIC | Age: 84
End: 2024-01-11
Payer: MEDICARE

## 2024-01-11 DIAGNOSIS — E78.5 HYPERLIPIDEMIA, UNSPECIFIED HYPERLIPIDEMIA TYPE: Primary | ICD-10-CM

## 2024-01-11 RX ORDER — ATORVASTATIN CALCIUM 10 MG/1
10 TABLET, FILM COATED ORAL NIGHTLY
Qty: 90 TABLET | Refills: 3 | Status: SHIPPED | OUTPATIENT
Start: 2024-01-11

## 2024-01-11 NOTE — TELEPHONE ENCOUNTER
----- Message from Dianne Zhang sent at 1/11/2024  9:25 AM CST -----  Regarding: med refill  .Type:  RX Refill Request    Who Called: pt daughter  Refill or New Rx:refill  RX Name and Strength:atorvastatin (LIPITOR) 10 MG tablet  How is the patient currently taking it? (ex. 1XDay):1xday  Is this a 30 day or 90 day RX:  Preferred Pharmacy with phone number:[id number] [address] AT [address]   Phone: [phone]  Fax:[phone]  Local or Mail Order:local  Ordering Provider:Yamile Hernandez  Would the patient rather a call back or a response via [facility]? Call back  Best Call Back Number:[phone]  Additional Information: pt needs authorization from  to refill prescription       
rolling walker

## 2024-01-31 LAB
BCS RECOMMENDATION EXT: NORMAL
BMD RECOMMENDATION EXT: NORMAL

## 2024-02-01 ENCOUNTER — DOCUMENTATION ONLY (OUTPATIENT)
Dept: PRIMARY CARE CLINIC | Facility: CLINIC | Age: 84
End: 2024-02-01
Payer: MEDICARE

## 2024-02-08 ENCOUNTER — DOCUMENTATION ONLY (OUTPATIENT)
Dept: PRIMARY CARE CLINIC | Facility: CLINIC | Age: 84
End: 2024-02-08
Payer: MEDICARE

## 2024-03-27 ENCOUNTER — TELEPHONE (OUTPATIENT)
Dept: PRIMARY CARE CLINIC | Facility: CLINIC | Age: 84
End: 2024-03-27
Payer: MEDICARE

## 2024-03-27 DIAGNOSIS — J31.0 CHRONIC RHINITIS: Primary | ICD-10-CM

## 2024-03-27 NOTE — TELEPHONE ENCOUNTER
----- Message from Sanjay Jang sent at 3/26/2024  3:47 PM CDT -----  Type:  Sooner Apoointment Request    Caller is requesting a sooner appointment.  Caller declined first available appointment listed below.  Caller will not accept being placed on the waitlist and is requesting a message be sent to doctor.    Name of Caller:Kendal Coles- pt daughter   When is the first available appointment? April 12      Best Call Back Number:[phone]    Additional Information: pt daughter called stated pt has had persistent cough for weeks, pt had recent visit to urgent care was told it may be allergies, Pt daughter stated cough is causing rib pain, asked for a call to discuss sooner appt of medication recommendations.

## 2024-03-27 NOTE — TELEPHONE ENCOUNTER
I spoke to MsNitesh Kendal. Ms. Amin has been sick every month since January. In January she was given Penicillin script, in February she was given amoxicillin. She has a constant cough causing her to stay awake at night and also causing her rib cage and back to hurt.   On 3/18/24 urgent care gave her Tessalon Perles and Alahist, which she finished.     She is Claritin, Flonase, and Singulair daily. She uses her Advair Diskus as needed.     Should she be referred to ENT for eval?

## 2024-03-27 NOTE — TELEPHONE ENCOUNTER
----- Message from Sanjay Jang sent at 3/26/2024  3:47 PM CDT -----  Type:  Sooner Apoointment Request    Caller is requesting a sooner appointment.  Caller declined first available appointment listed below.  Caller will not accept being placed on the waitlist and is requesting a message be sent to doctor.    Name of Caller:Kendal Coles- pt daughter   When is the first available appointment? April 12      Best Call Back Number:[phone]    Additional Information: pt daughter called stated pt has had persistent cough for weeks, pt had recent visit to urgent care was told it may be allergies, Pt daughter stated cough is causing rib pain, asked for a call to discuss sooner appt of medication recommendations.

## 2024-03-28 ENCOUNTER — TELEPHONE (OUTPATIENT)
Dept: PRIMARY CARE CLINIC | Facility: CLINIC | Age: 84
End: 2024-03-28
Payer: MEDICARE

## 2024-03-28 ENCOUNTER — PATIENT MESSAGE (OUTPATIENT)
Dept: PRIMARY CARE CLINIC | Facility: CLINIC | Age: 84
End: 2024-03-28
Payer: MEDICARE

## 2024-03-28 DIAGNOSIS — J31.0 CHRONIC RHINITIS: ICD-10-CM

## 2024-03-28 DIAGNOSIS — R05.3 COUGH, PERSISTENT: Primary | ICD-10-CM

## 2024-03-28 NOTE — TELEPHONE ENCOUNTER
I spoke with Ms. Edmonds, patient's daughter, she would like to proceed with the ENT referral for Ms. Amin.   Referral to [facility] placed and faxed

## 2024-03-28 NOTE — TELEPHONE ENCOUNTER
----- Message from Abby Rivas sent at 3/28/2024 11:25 AM CDT -----  Regarding: advice  Type:  Needs Medical Advice    Who Called: pt daughter Kendal Wolff Call Back Number: [phone]  Additional Information: stated that she would like a call back about the portal message that was sent. Please advise

## 2024-05-16 DIAGNOSIS — K52.89 OTHER SPECIFIED NONINFECTIVE GASTROENTERITIS AND COLITIS: ICD-10-CM

## 2024-05-16 RX ORDER — MESALAMINE 800 MG/1
800 TABLET, DELAYED RELEASE ORAL 2 TIMES DAILY
Qty: 180 TABLET | Refills: 3 | Status: SHIPPED | OUTPATIENT
Start: 2024-05-16

## 2024-06-03 ENCOUNTER — TELEPHONE (OUTPATIENT)
Dept: PRIMARY CARE CLINIC | Facility: CLINIC | Age: 84
End: 2024-06-03
Payer: MEDICARE

## 2024-06-03 NOTE — TELEPHONE ENCOUNTER
----- Message from Kimberlyn Harvey LPN sent at 5/30/2024  4:41 PM CDT -----    ----- Message -----  From: Erin Carmona  Sent: 5/30/2024  11:46 AM CDT  To: Yamile Hernandez Staff    Who Called: Rbennan Ponton    Caller is requesting assistance/information from provider's office      Preferred Method of Contact: Phone Call  Patient's Preferred Phone Number on File: [phone]   Best Call Back Number, if different:  Additional Information:  ([facility] ([phone]) fax# [phone])called to confirm if fax was received for request for  Rx for mastectomy bra and demo sheet

## 2024-06-20 ENCOUNTER — OFFICE VISIT (OUTPATIENT)
Dept: PRIMARY CARE CLINIC | Facility: CLINIC | Age: 84
End: 2024-06-20
Payer: MEDICARE

## 2024-06-20 VITALS
HEART RATE: 78 BPM | SYSTOLIC BLOOD PRESSURE: 110 MMHG | RESPIRATION RATE: 18 BRPM | DIASTOLIC BLOOD PRESSURE: 70 MMHG | BODY MASS INDEX: 25.16 KG/M2 | OXYGEN SATURATION: 98 % | HEIGHT: 65 IN | TEMPERATURE: 97 F | WEIGHT: 151 LBS

## 2024-06-20 DIAGNOSIS — G62.9 PERIPHERAL POLYNEUROPATHY: ICD-10-CM

## 2024-06-20 DIAGNOSIS — F02.B0 MODERATE LATE ONSET ALZHEIMER'S DEMENTIA WITHOUT BEHAVIORAL DISTURBANCE, PSYCHOTIC DISTURBANCE, MOOD DISTURBANCE, OR ANXIETY: ICD-10-CM

## 2024-06-20 DIAGNOSIS — G30.1 MODERATE LATE ONSET ALZHEIMER'S DEMENTIA WITHOUT BEHAVIORAL DISTURBANCE, PSYCHOTIC DISTURBANCE, MOOD DISTURBANCE, OR ANXIETY: ICD-10-CM

## 2024-06-20 DIAGNOSIS — D17.0 LIPOMA OF NECK: ICD-10-CM

## 2024-06-20 DIAGNOSIS — L98.9 SKIN LESION OF FACE: Primary | ICD-10-CM

## 2024-06-20 PROBLEM — J44.9 CHRONIC OBSTRUCTIVE PULMONARY DISEASE, UNSPECIFIED COPD TYPE: Status: RESOLVED | Noted: 2023-11-16 | Resolved: 2024-06-20

## 2024-06-20 PROCEDURE — 99214 OFFICE O/P EST MOD 30 MIN: CPT | Mod: ,,, | Performed by: STUDENT IN AN ORGANIZED HEALTH CARE EDUCATION/TRAINING PROGRAM

## 2024-06-20 NOTE — PROGRESS NOTES
) some length Chief Complaint  Chief Complaint   Patient presents with    6 month chronic condition follow up    Check skin moles on back and face     Mass     Posterior neck, no pain, noticed it about 1 month ago     Neurologic Problem     Neuropathy symptoms both feet, worse on right, tried gabapentin in past       HPI  Brennan Lovett is a 84 y.o. female with medical diagnoses as listed in the medical history and problem list that presents to clinic for her 6 months follow up.   Acute concerns:   - A soft lump upper back for a while that does not hurt or bother the patient.   - Several skin lesions on face and forehead that have enlarged and got darken. Patient denies history of skin cancer but has lots sun damage.   - Burning on the bottom of both feet constantly. Patient was on gabapentin in the past which did not help. The pain is tolerable but she wonders if it would resolve completely.  I have printed something by mistake    Health Maintenance         Date Due Completion Date    Shingles Vaccine (1 of 2) Never done ---    RSV Vaccine (Age 60+ and Pregnant patients) (1 - 1-dose 60+ series) Never done ---    Pneumococcal Vaccines (Age 65+) (2 of 2 - PCV) 06/18/2008 6/18/2007    COVID-19 Vaccine (2 - 2023-24 season) 09/01/2023 1/11/2022    DEXA Scan 01/31/2026 1/31/2024    TETANUS VACCINE 08/13/2028 8/13/2018    Lipid Panel 11/16/2028 11/16/2023            ALLERGIES AND MEDICATIONS: updated and reviewed.  Review of patient's allergies indicates:   Allergen Reactions    Sulfa (sulfonamide antibiotics) Hives     Current Outpatient Medications   Medication Sig Dispense Refill    atorvastatin (LIPITOR) 10 MG tablet Take 1 tablet (10 mg total) by mouth every evening. 90 tablet 3    donepeziL (ARICEPT) 10 MG tablet Take 1 tablet (10 mg total) by mouth every evening. 90 tablet 3    DULoxetine (CYMBALTA) 60 MG capsule Take 1 capsule (60 mg total) by mouth once daily. 90 capsule 3    fluticasone propionate (FLONASE) 50  "mcg/actuation nasal spray 2 sprays by Each Nostril route once daily.      fluticasone-salmeterol diskus inhaler 100-50 mcg Inhale 1 puff into the lungs 2 (two) times daily. Controller 60 each 3    GEMTESA 75 mg Tab Take 1 tablet by mouth Daily.      loratadine (CLARITIN) 10 mg tablet Take 10 mg by mouth once daily.      mesalamine (ASACOL) 800 mg TbEC TAKE 1 TABLET(800 MG) BY MOUTH TWICE DAILY 180 tablet 3    metoprolol succinate (TOPROL-XL) 50 MG 24 hr tablet Take 50 mg by mouth Daily.      montelukast (SINGULAIR) 10 mg tablet Take 1 tablet (10 mg total) by mouth once daily. 90 tablet 3    MULTIVITAMIN ORAL Take 1 tablet by mouth Daily.      MYRBETRIQ 50 mg Tb24 Take 1 tablet by mouth Daily.      nystatin (MYCOSTATIN) cream Apply topically 2 (two) times daily. 60 g 1    UNABLE TO FIND Take 1 tablet by mouth 2 (two) times a day. Perservere Reds       No current facility-administered medications for this visit.       Histories are reviewed and updated as appropriate     Review of Systems  Comprehensive review of system performed- negative except noted in HPI       Objective:   Vitals:    06/20/24 0900   BP: 110/70   BP Location: Left arm   Patient Position: Sitting   BP Method: Large (Manual)   Pulse: 78   Resp: 18   Temp: 97.3 °F (36.3 °C)   TempSrc: Oral   SpO2: 98%   Weight: 68.5 kg (151 lb)   Height: 5' 5" (1.651 m)    Body mass index is 25.13 kg/m².  Physical Exam  Vitals and nursing note reviewed.   Constitutional:       General: She is not in acute distress.     Appearance: Normal appearance. She is not ill-appearing.   HENT:      Head: Normocephalic and atraumatic.      Mouth/Throat:      Mouth: Mucous membranes are moist.      Pharynx: Oropharynx is clear.   Eyes:      Extraocular Movements: Extraocular movements intact.      Conjunctiva/sclera: Conjunctivae normal.   Neck:      Comments: Soft lipoma base of neck   Cardiovascular:      Rate and Rhythm: Normal rate and regular rhythm.   Pulmonary:      " Effort: Pulmonary effort is normal.      Breath sounds: Normal breath sounds.   Abdominal:      General: Abdomen is flat.      Palpations: Abdomen is soft.   Musculoskeletal:         General: Normal range of motion.   Skin:     General: Skin is warm and dry.      Findings: Lesion (several hyperpigmented lesions on face concerning for abnormal color distribution) present.   Neurological:      General: No focal deficit present.      Mental Status: She is alert and oriented to person, place, and time. Mental status is at baseline.   Psychiatric:         Mood and Affect: Mood normal.         Behavior: Behavior normal.           Assessment & Plan  1. Skin lesion of face  -     Ambulatory referral/consult to Dermatology; Future; Expected date: 06/27/2024    2. Lipoma of neck  Reassurance     3. Moderate late onset Alzheimer's dementia without behavioral disturbance, psychotic disturbance, mood disturbance, or anxiety  Overview:  Stable on Aricept 10mg daily     4. Peripheral polyneuropathy  Patient does not desire to start any addition mediations.          RTC in 6 months for wellness

## 2024-07-15 DIAGNOSIS — J45.20 MILD INTERMITTENT ASTHMA, UNCOMPLICATED: ICD-10-CM

## 2024-07-15 RX ORDER — MONTELUKAST SODIUM 10 MG/1
10 TABLET ORAL
Qty: 90 TABLET | Refills: 3 | Status: SHIPPED | OUTPATIENT
Start: 2024-07-15

## 2024-10-20 DIAGNOSIS — E78.5 HYPERLIPIDEMIA, UNSPECIFIED HYPERLIPIDEMIA TYPE: ICD-10-CM

## 2024-10-21 RX ORDER — ATORVASTATIN CALCIUM 10 MG/1
10 TABLET, FILM COATED ORAL NIGHTLY
Qty: 90 TABLET | Refills: 3 | Status: SHIPPED | OUTPATIENT
Start: 2024-10-21

## 2024-11-02 DIAGNOSIS — I10 PRIMARY HYPERTENSION: ICD-10-CM

## 2024-11-04 RX ORDER — DONEPEZIL HYDROCHLORIDE 10 MG/1
10 TABLET, FILM COATED ORAL NIGHTLY
Qty: 90 TABLET | Refills: 3 | Status: SHIPPED | OUTPATIENT
Start: 2024-11-04

## 2024-11-06 ENCOUNTER — TELEPHONE (OUTPATIENT)
Dept: PRIMARY CARE CLINIC | Facility: CLINIC | Age: 84
End: 2024-11-06
Payer: MEDICARE

## 2024-11-06 DIAGNOSIS — K64.9 BLEEDING HEMORRHOID: Primary | ICD-10-CM

## 2024-11-06 RX ORDER — HYDROCORTISONE 25 MG/G
CREAM TOPICAL 2 TIMES DAILY
Qty: 28 G | Refills: 1 | Status: SHIPPED | OUTPATIENT
Start: 2024-11-06

## 2024-11-06 NOTE — TELEPHONE ENCOUNTER
Ms Amin has a hemorrhoid that is getting larger and painful. Does she need appointment here or a referral to GI?

## 2024-11-06 NOTE — TELEPHONE ENCOUNTER
Ms Brennan has tried OTC preparation H, Can you send in prescription cream?     GI referral placed.

## 2024-11-06 NOTE — TELEPHONE ENCOUNTER
----- Message from Abby sent at 11/6/2024  2:44 PM CST -----  Regarding: advice  Who Called: Brennan Ponton    Caller is requesting assistance/information from provider's office.    Symptoms (please be specific):    How long has patient had these symptoms:    List of preferred pharmacies on file (remove unneeded): [unfilled]  If different, enter pharmacy into here including location and phone number:       Preferred Method of Contact: Phone Call    Patient's Preferred Phone Number on File: [phone]   Best Call Back Number, if different:    Additional Information: stated that she does not need the gastro referral anymore being the meds that were sent by pcp is helping the problem. Please advise

## 2024-11-06 NOTE — TELEPHONE ENCOUNTER
----- Message from [employer] sent at 11/6/2024  8:47 AM CST -----  .Who Called: pt's daughter Kendla Coles     Caller is requesting a sooner appointment. Caller declined first available appointment listed below. Caller will not accept being placed on the waitlist and is requesting a message be sent to doctor.    When is the first available appointment?11/12/24  Options offered (Virtual Visit, Urgent Care): visit   Symptoms:hemeroid growth       Preferred Method of Contact: Phone Call  Patient's Preferred Phone Number on File: [phone]   Best Call Back Number, if different:  Additional Information: called for the appt, scheduled, but will like something sooner

## 2024-11-07 ENCOUNTER — TELEPHONE (OUTPATIENT)
Dept: PRIMARY CARE CLINIC | Facility: CLINIC | Age: 84
End: 2024-11-07
Payer: MEDICARE

## 2024-11-07 NOTE — TELEPHONE ENCOUNTER
----- Message from Dianne sent at 11/7/2024  7:40 AM CST -----  Regarding: call back  .Who Called: Brennan Ponton    Caller is requesting assistance/information from provider's office.    Symptoms (please be specific):    How long has patient had these symptoms:    List of preferred pharmacies on file (remove unneeded): [unfilled]  If different, enter pharmacy into here including location and phone number:       Preferred Method of Contact: Phone Call  Patient's Preferred Phone Number on File: [phone]   Best Call Back Number, if different:  Additional Information: pt daughter called requesting a referral for a gastro

## 2024-11-19 ENCOUNTER — LAB VISIT (OUTPATIENT)
Dept: LAB | Facility: HOSPITAL | Age: 84
End: 2024-11-19
Attending: PHYSICIAN ASSISTANT
Payer: MEDICARE

## 2024-11-19 DIAGNOSIS — R19.4 CHANGE IN BOWEL HABITS: Primary | ICD-10-CM

## 2024-11-19 DIAGNOSIS — K52.9 COLITIS: ICD-10-CM

## 2024-11-19 DIAGNOSIS — K62.89 ANORECTAL PAIN: ICD-10-CM

## 2024-11-19 DIAGNOSIS — K64.8 INTERNAL HEMORRHOIDS: ICD-10-CM

## 2024-11-19 DIAGNOSIS — K59.00 COLONIC CONSTIPATION: ICD-10-CM

## 2024-11-19 LAB
ALBUMIN SERPL-MCNC: 4 G/DL (ref 3.4–4.8)
ALBUMIN/GLOB SERPL: 1.3 RATIO (ref 1.1–2)
ALP SERPL-CCNC: 65 UNIT/L (ref 40–150)
ALT SERPL-CCNC: 15 UNIT/L (ref 0–55)
ANION GAP SERPL CALC-SCNC: 11 MEQ/L
AST SERPL-CCNC: 20 UNIT/L (ref 5–34)
BASOPHILS # BLD AUTO: 0.03 X10(3)/MCL
BASOPHILS NFR BLD AUTO: 0.6 %
BILIRUB SERPL-MCNC: 0.5 MG/DL
BUN SERPL-MCNC: 20.8 MG/DL (ref 9.8–20.1)
CALCIUM SERPL-MCNC: 10.2 MG/DL (ref 8.4–10.2)
CHLORIDE SERPL-SCNC: 104 MMOL/L (ref 98–107)
CO2 SERPL-SCNC: 30 MMOL/L (ref 23–31)
CREAT SERPL-MCNC: 1.27 MG/DL (ref 0.55–1.02)
CREAT/UREA NIT SERPL: 16
CRP SERPL-MCNC: <1 MG/L
EOSINOPHIL # BLD AUTO: 0.06 X10(3)/MCL (ref 0–0.9)
EOSINOPHIL NFR BLD AUTO: 1.1 %
ERYTHROCYTE [DISTWIDTH] IN BLOOD BY AUTOMATED COUNT: 13 % (ref 11.5–17)
FERRITIN SERPL-MCNC: 95.79 NG/ML (ref 4.63–204)
FOLATE SERPL-MCNC: 18.4 NG/ML (ref 7–31.4)
GFR SERPLBLD CREATININE-BSD FMLA CKD-EPI: 42 ML/MIN/1.73/M2
GLOBULIN SER-MCNC: 3 GM/DL (ref 2.4–3.5)
GLUCOSE SERPL-MCNC: 119 MG/DL (ref 82–115)
HCT VFR BLD AUTO: 45.4 % (ref 37–47)
HGB BLD-MCNC: 14.3 G/DL (ref 12–16)
IMM GRANULOCYTES # BLD AUTO: 0.01 X10(3)/MCL (ref 0–0.04)
IMM GRANULOCYTES NFR BLD AUTO: 0.2 %
IRON SATN MFR SERPL: 36 % (ref 20–50)
IRON SERPL-MCNC: 113 UG/DL (ref 50–170)
LYMPHOCYTES # BLD AUTO: 0.98 X10(3)/MCL (ref 0.6–4.6)
LYMPHOCYTES NFR BLD AUTO: 18.5 %
MCH RBC QN AUTO: 34 PG (ref 27–31)
MCHC RBC AUTO-ENTMCNC: 31.5 G/DL (ref 33–36)
MCV RBC AUTO: 108.1 FL (ref 80–94)
MONOCYTES # BLD AUTO: 0.56 X10(3)/MCL (ref 0.1–1.3)
MONOCYTES NFR BLD AUTO: 10.5 %
NEUTROPHILS # BLD AUTO: 3.67 X10(3)/MCL (ref 2.1–9.2)
NEUTROPHILS NFR BLD AUTO: 69.1 %
NRBC BLD AUTO-RTO: 0 %
PLATELET # BLD AUTO: 177 X10(3)/MCL (ref 130–400)
PMV BLD AUTO: 10.3 FL (ref 7.4–10.4)
POTASSIUM SERPL-SCNC: 4.9 MMOL/L (ref 3.5–5.1)
PROT SERPL-MCNC: 7 GM/DL (ref 5.8–7.6)
RBC # BLD AUTO: 4.2 X10(6)/MCL (ref 4.2–5.4)
SODIUM SERPL-SCNC: 145 MMOL/L (ref 136–145)
T3FREE SERPL-MCNC: 2.93 PG/ML (ref 1.58–3.91)
T4 SERPL-MCNC: 7.78 UG/DL (ref 4.87–11.72)
TIBC SERPL-MCNC: 204 UG/DL (ref 70–310)
TIBC SERPL-MCNC: 317 UG/DL (ref 250–450)
TRANSFERRIN SERPL-MCNC: 281 MG/DL
TSH SERPL-ACNC: 2.4 UIU/ML (ref 0.35–4.94)
VIT B12 SERPL-MCNC: 684 PG/ML (ref 213–816)
WBC # BLD AUTO: 5.31 X10(3)/MCL (ref 4.5–11.5)

## 2024-11-19 PROCEDURE — 83550 IRON BINDING TEST: CPT | Mod: GA

## 2024-11-19 PROCEDURE — 36415 COLL VENOUS BLD VENIPUNCTURE: CPT

## 2024-11-19 PROCEDURE — 86140 C-REACTIVE PROTEIN: CPT

## 2024-11-19 PROCEDURE — 85025 COMPLETE CBC W/AUTO DIFF WBC: CPT

## 2024-11-19 PROCEDURE — 84481 FREE ASSAY (FT-3): CPT

## 2024-11-19 PROCEDURE — 82746 ASSAY OF FOLIC ACID SERUM: CPT

## 2024-11-19 PROCEDURE — 82728 ASSAY OF FERRITIN: CPT | Mod: GA

## 2024-11-19 PROCEDURE — 82607 VITAMIN B-12: CPT

## 2024-11-19 PROCEDURE — 80053 COMPREHEN METABOLIC PANEL: CPT

## 2024-11-19 PROCEDURE — 84436 ASSAY OF TOTAL THYROXINE: CPT

## 2024-11-19 PROCEDURE — 84443 ASSAY THYROID STIM HORMONE: CPT

## 2024-11-20 ENCOUNTER — LAB REQUISITION (OUTPATIENT)
Dept: LAB | Facility: HOSPITAL | Age: 84
End: 2024-11-20
Payer: MEDICARE

## 2024-11-20 DIAGNOSIS — K52.9 NONINFECTIVE GASTROENTERITIS AND COLITIS, UNSPECIFIED: ICD-10-CM

## 2024-11-20 DIAGNOSIS — K62.89 OTHER SPECIFIED DISEASES OF ANUS AND RECTUM: ICD-10-CM

## 2024-11-20 DIAGNOSIS — K64.8 OTHER HEMORRHOIDS: ICD-10-CM

## 2024-11-20 DIAGNOSIS — R19.4 CHANGE IN BOWEL HABIT: ICD-10-CM

## 2024-11-20 DIAGNOSIS — K59.00 CONSTIPATION, UNSPECIFIED: ICD-10-CM

## 2024-11-20 PROCEDURE — 83993 ASSAY FOR CALPROTECTIN FECAL: CPT | Performed by: PHYSICIAN ASSISTANT

## 2024-11-23 LAB — CALPROTECTIN STL-MCNT: <50 MCG/G

## 2024-12-27 ENCOUNTER — OFFICE VISIT (OUTPATIENT)
Dept: PRIMARY CARE CLINIC | Facility: CLINIC | Age: 84
End: 2024-12-27
Payer: MEDICARE

## 2024-12-27 VITALS
TEMPERATURE: 97 F | DIASTOLIC BLOOD PRESSURE: 80 MMHG | RESPIRATION RATE: 18 BRPM | OXYGEN SATURATION: 98 % | HEART RATE: 90 BPM | BODY MASS INDEX: 26.16 KG/M2 | HEIGHT: 65 IN | WEIGHT: 157 LBS | SYSTOLIC BLOOD PRESSURE: 132 MMHG

## 2024-12-27 DIAGNOSIS — K52.89 OTHER SPECIFIED NONINFECTIVE GASTROENTERITIS AND COLITIS: ICD-10-CM

## 2024-12-27 DIAGNOSIS — N18.32 STAGE 3B CHRONIC KIDNEY DISEASE: ICD-10-CM

## 2024-12-27 DIAGNOSIS — Z00.00 MEDICARE ANNUAL WELLNESS VISIT, SUBSEQUENT: Primary | ICD-10-CM

## 2024-12-27 DIAGNOSIS — I10 PRIMARY HYPERTENSION: ICD-10-CM

## 2024-12-27 DIAGNOSIS — E78.5 HYPERLIPIDEMIA, UNSPECIFIED HYPERLIPIDEMIA TYPE: ICD-10-CM

## 2024-12-27 DIAGNOSIS — N32.81 OAB (OVERACTIVE BLADDER): ICD-10-CM

## 2024-12-27 NOTE — PROGRESS NOTES
Patient ID: [id number]     Chief Complaint: Medicare AWV Follow Up (Labs done 11/2024, Dexa 1/2024)      HPI:     Brennan Lovett is a 84 y.o. female here today for a Medicare Wellness.     She lives at home with daughter and is well taken care of. Patient is ambulating well around the house. She does some housework. Denies feeling depressed or hopelessness.   Patient follows up with specialist as scheduled. She recently saw dermatology and there was no concern for cancer.   Current and past medical history reviewed.  Pertinent family and social history reviewed.    Dexa done 01/2024   Vaccinations due. vaccination status reviewed, if due and if desired, vaccinations provided and given     No complaints today.    Opioid Screening: Patient medication list reviewed, patient is not taking prescription opioids. Patient is at low risk of substance abuse based on this opioid use history.   Patient wears pads at night. Denies urinary leakage .      -------------------------------------    Allergy    Anxiety    Dementia    Depression    History of right breast cancer    Stage 1 (does not carry the gene)    Hyperlipidemia    Hypertension    Mild intermittent asthma, uncomplicated    OAB (overactive bladder)    Other specified noninfective gastroenteritis and colitis        Past Surgical History:   Procedure Laterality Date    BACK SURGERY      BREAST LUMPECTOMY Right     CHOLECYSTECTOMY      HYSTERECTOMY         Review of patient's allergies indicates:   Allergen Reactions    Sulfa (sulfonamide antibiotics) Hives       Outpatient Medications Marked as Taking for the 12/27/24 encounter (Office Visit) with Anh Ovalle MD   Medication Sig Dispense Refill    atorvastatin (LIPITOR) 10 MG tablet TAKE 1 TABLET(10 MG) BY MOUTH EVERY EVENING 90 tablet 3    donepeziL (ARICEPT) 10 MG tablet TAKE 1 TABLET(10 MG) BY MOUTH EVERY EVENING 90 tablet 3    DULoxetine (CYMBALTA) 60 MG capsule Take 1 capsule (60 mg total) by mouth once daily. 90  capsule 3    fluticasone propionate (FLONASE) 50 mcg/actuation nasal spray 2 sprays by Each Nostril route once daily.      fluticasone-salmeterol diskus inhaler 100-50 mcg Inhale 1 puff into the lungs 2 (two) times daily. Controller 60 each 3    GEMTESA 75 mg Tab Take 1 tablet by mouth Daily.      hydrocortisone 2.5 % cream Apply topically 2 (two) times daily. 28 g 1    loratadine (CLARITIN) 10 mg tablet Take 10 mg by mouth once daily.      mesalamine (ASACOL) 800 mg TbEC TAKE 1 TABLET(800 MG) BY MOUTH TWICE DAILY 180 tablet 3    metoprolol succinate (TOPROL-XL) 50 MG 24 hr tablet Take 50 mg by mouth Daily.      montelukast (SINGULAIR) 10 mg tablet TAKE 1 TABLET(10 MG) BY MOUTH EVERY DAY 90 tablet 3    MULTIVITAMIN ORAL Take 1 tablet by mouth Daily.      MYRBETRIQ 50 mg Tb24 Take 1 tablet by mouth Daily.      nystatin (MYCOSTATIN) cream Apply topically 2 (two) times daily. 60 g 1    UNABLE TO FIND Take 1 tablet by mouth 2 (two) times a day. [place]         Social History     Socioeconomic History    Marital status: Single   Tobacco Use    Smoking status: Former     Types: Cigarettes    Smokeless tobacco: Never   Substance and Sexual Activity    Alcohol use: Not Currently    Drug use: Never    Sexual activity: Not Currently     Social Drivers of Health     Financial Resource Strain: Low Risk  (6/17/2024)    Overall Financial Resource Strain (CARDIA)     Difficulty of Paying Living Expenses: Not hard at all   Food Insecurity: Patient Declined (6/17/2024)    Hunger Vital Sign     Worried About Running Out of Food in the Last Year: Patient declined     Ran Out of Food in the Last Year: Patient declined   Physical Activity: Unknown (6/17/2024)    Exercise Vital Sign     Days of Exercise per Week: Patient declined   Stress: Patient Declined (6/17/2024)    [employer] - Occupational Stress Questionnaire     Feeling of Stress : Patient declined   Housing Stability: Unknown (6/17/2024)     "Housing Stability Vital Sign     Unable to Pay for Housing in the Last Year: Patient declined        Family History   Family history unknown: Yes        Patient Care Team:  Anh Ovalle MD as PCP - General (Family Medicine)  Jack Correia MD (Otolaryngology)  Carmelita Cleveland MD as Consulting Physician (Urology)  Eduar Garcia MD as Consulting Physician (Dermatology)  Yaron Canada MD as Consulting Physician (Gastroenterology)       Subjective:     Review of Systems   Constitutional:  Negative for chills, fever and weight loss.   HENT:  Negative for hearing loss.    Eyes:  Negative for blurred vision and double vision.   Respiratory:  Negative for cough, shortness of breath and wheezing.    Cardiovascular:  Negative for chest pain, palpitations and leg swelling.   Gastrointestinal:  Negative for blood in stool, constipation, diarrhea, melena, nausea and vomiting.   Genitourinary:  Negative for dysuria, frequency and urgency.   Musculoskeletal:  Negative for falls.   Neurological:  Negative for dizziness, weakness and headaches.   Psychiatric/Behavioral:  Negative for depression, hallucinations and memory loss.          Patient Reported Health Risk Assessment       Objective:     /80 (BP Location: Left arm, Patient Position: Sitting)   Pulse 90   Temp 97.2 °F (36.2 °C) (Oral)   Resp 18   Ht 5' 5" (1.651 m)   Wt 71.2 kg (157 lb)   SpO2 98%   BMI 26.13 kg/m²     Physical Exam  Vitals and nursing note reviewed.   Constitutional:       General: She is not in acute distress.     Appearance: Normal appearance.   HENT:      Head: Normocephalic and atraumatic.      Mouth/Throat:      Mouth: Mucous membranes are moist.      Pharynx: Oropharynx is clear.   Eyes:      Extraocular Movements: Extraocular movements intact.      Conjunctiva/sclera: Conjunctivae normal.   Cardiovascular:      Rate and Rhythm: Normal rate and regular rhythm.   Pulmonary:      Effort: Pulmonary effort is " normal.      Breath sounds: Normal breath sounds.   Abdominal:      General: Abdomen is flat.      Palpations: Abdomen is soft.   Musculoskeletal:         General: Normal range of motion.   Skin:     General: Skin is warm and dry.   Neurological:      General: No focal deficit present.      Mental Status: She is alert and oriented to person, place, and time. Mental status is at baseline.   Psychiatric:         Mood and Affect: Mood normal.         Behavior: Behavior normal.                No data to display                  12/27/2024     9:30 AM 6/20/2024     9:45 AM 12/20/2023     3:30 PM 11/16/2023     9:15 AM 8/16/2023     9:30 AM 8/2/2023     1:00 PM   Fall Risk Assessment - Outpatient   Mobility Status Ambulatory Ambulatory Ambulatory Ambulatory Ambulatory Ambulatory   Number of falls 0 0 0 0 0 0   Identified as fall risk False False False False False False              Assessment/Plan:     1. Medicare annual wellness visit, subsequent  Overall health status was reviewed   Good health habits reinforced   Labs reviewed - cardiovascular disease risk factors discussed   Appropriate recommendations and preventative care medical information provided, with annual wellness exam encouraged.     2. Stage 3b chronic kidney disease  Slightly worsening kidney function with BUN/Cr 20.8/1.27  Patient is drinking plenty of water during the day   Avoid nephrotoxic medications like NSAIDs.   3. Primary hypertension  Overview:  Stable on metoprolol XL 50mg daily       4. Hyperlipidemia, unspecified hyperlipidemia type  Overview:  Stable on Lipitor 10mg daily       5. Other specified noninfective gastroenteritis and colitis  Overview:  Stable on mesalamine daily       6. OAB (overactive bladder)  Overview:  Patient was seeing an urologist and symptoms are stable   Compliant with [employer]      Medicare Annual Wellness and Personalized Prevention Plan:   Fall Risk + Home Safety + Hearing Impairment + Depression Screen + Opioid  and Substance Abuse Screening + Cognitive Impairment Screen + Health Risk Assessment all reviewed.     Health Maintenance Topics with due status: Not Due       Topic Last Completion Date    TETANUS VACCINE 08/13/2018    Lipid Panel 11/16/2023    DEXA Scan 01/31/2024      The patient's Health Maintenance was reviewed and the following appears to be due at this time:   Health Maintenance Due   Topic Date Due    Shingles Vaccine (1 of 2) Never done    Pneumococcal Vaccines (Age 50+) (2 of 2 - PCV) 06/18/2008    RSV Vaccine (Age 60+ and Pregnant patients) (1 - 1-dose 75+ series) Never done    COVID-19 Vaccine (2 - 2024-25 season) 09/01/2024       Advance Care Planning     Date: 12/27/2024    Living Will  During this visit, I engaged the patient  in the voluntary advance care planning process.  The patient and I reviewed the role for advance directives and their purpose in directing future healthcare if the patient's unable to speak for him/herself.  At this point in time, the patient does have full decision-making capacity.  We discussed different extreme health states that she could experience, and reviewed what kind of medical care she would want in those situations.  The patient communicated that if she were comatose and had little chance of a meaningful recovery, she has discussed with her daughter about arrangement          Follow up in about 6 months (around 6/27/2025) for Chronic conditions. In addition to their scheduled follow up, the patient has also been instructed to follow up on as needed basis.

## 2025-02-11 DIAGNOSIS — J45.20 MILD INTERMITTENT ASTHMA, UNCOMPLICATED: ICD-10-CM

## 2025-02-11 RX ORDER — MONTELUKAST SODIUM 10 MG/1
10 TABLET ORAL
Qty: 90 TABLET | Refills: 3 | Status: SHIPPED | OUTPATIENT
Start: 2025-02-11

## 2025-03-07 LAB — BCS RECOMMENDATION EXT: NORMAL

## 2025-03-18 ENCOUNTER — DOCUMENTATION ONLY (OUTPATIENT)
Dept: PRIMARY CARE CLINIC | Facility: CLINIC | Age: 85
End: 2025-03-18
Payer: MEDICARE

## 2025-05-31 DIAGNOSIS — K52.89 OTHER SPECIFIED NONINFECTIVE GASTROENTERITIS AND COLITIS: ICD-10-CM

## 2025-06-02 RX ORDER — MESALAMINE 800 MG/1
800 TABLET, DELAYED RELEASE ORAL 2 TIMES DAILY
Qty: 180 TABLET | Refills: 3 | Status: SHIPPED | OUTPATIENT
Start: 2025-06-02

## 2025-06-27 ENCOUNTER — OFFICE VISIT (OUTPATIENT)
Dept: PRIMARY CARE CLINIC | Facility: CLINIC | Age: 85
End: 2025-06-27
Payer: MEDICARE

## 2025-06-27 VITALS
SYSTOLIC BLOOD PRESSURE: 126 MMHG | OXYGEN SATURATION: 99 % | TEMPERATURE: 97 F | RESPIRATION RATE: 18 BRPM | HEART RATE: 100 BPM | BODY MASS INDEX: 25.83 KG/M2 | WEIGHT: 155 LBS | DIASTOLIC BLOOD PRESSURE: 76 MMHG | HEIGHT: 65 IN

## 2025-06-27 DIAGNOSIS — I10 PRIMARY HYPERTENSION: Primary | ICD-10-CM

## 2025-06-27 DIAGNOSIS — J45.20 MILD INTERMITTENT ASTHMA, UNCOMPLICATED: ICD-10-CM

## 2025-06-27 DIAGNOSIS — F32.A DEPRESSION, UNSPECIFIED DEPRESSION TYPE: ICD-10-CM

## 2025-06-27 DIAGNOSIS — F41.9 ANXIETY: ICD-10-CM

## 2025-06-27 NOTE — PROGRESS NOTES
"  Chief Complaint  Chief Complaint   Patient presents with    6 month chronic follow up       HPI  Brennan Lovett is a 85 y.o. female with medical diagnoses as listed in the medical history and problem list that presents for 6 months chronic conditions follow up. Patient feels good and reports no change in medication. Denies falls or feeling neglected at home,.   She is compliant with all medication without any side effects.  Denies shortness of breath or chest pain. Denies edema of legs or pain. She has some new skin lesions that are followed by dermatologist     Health Maintenance         Date Due Completion Date    Shingles Vaccine (1 of 2) Never done ---    Pneumococcal Vaccines (Age 50+) (2 of 2 - PCV) 06/18/2008 6/18/2007    RSV Vaccine (Age 60+ and Pregnant patients) (1 - 1-dose 75+ series) Never done ---    COVID-19 Vaccine (2 - 2024-25 season) 09/01/2024 1/11/2022    TETANUS VACCINE 08/13/2028 8/13/2018    Lipid Panel 11/16/2028 11/16/2023            ALLERGIES AND MEDICATIONS: updated and reviewed.  Review of patient's allergies indicates:   Allergen Reactions    Sulfa (sulfonamide antibiotics) Hives     Current Medications[1]    Histories are reviewed and updated as appropriate     Review of Systems  Comprehensive review of system performed- negative except noted in HPI       Objective:   Vitals:    06/27/25 0836   BP: 126/76   BP Location: Left arm   Patient Position: Sitting   Pulse: 100   Resp: 18   Temp: 97.2 °F (36.2 °C)   TempSrc: Oral   SpO2: 99%   Weight: 70.3 kg (155 lb)   Height: 5' 5" (1.651 m)    Body mass index is 25.79 kg/m².  Physical Exam  Vitals and nursing note reviewed.   Constitutional:       General: She is not in acute distress.     Appearance: Normal appearance.   HENT:      Head: Normocephalic and atraumatic.      Mouth/Throat:      Mouth: Mucous membranes are moist.   Eyes:      Extraocular Movements: Extraocular movements intact.      Conjunctiva/sclera: Conjunctivae normal. "   Cardiovascular:      Rate and Rhythm: Normal rate.   Pulmonary:      Effort: Pulmonary effort is normal.      Breath sounds: Normal breath sounds.   Musculoskeletal:         General: Normal range of motion.      Cervical back: Normal range of motion.   Skin:     General: Skin is warm and dry.   Neurological:      General: No focal deficit present.      Mental Status: She is alert and oriented to person, place, and time.   Psychiatric:         Mood and Affect: Mood normal.         Behavior: Behavior normal.           Assessment & Plan  1. Primary hypertension  Overview:  Stable on metoprolol XL 50mg daily       2. Depression, unspecified depression type  Stable on Duloxetine 60mg daily     3. Anxiety  Overview:  Stable on Duloxetine 60mg daily       4. Mild intermittent asthma, uncomplicated  Asymptomatic   Cont Advair 100/50 and Singular 10mg daily       Follow up in about 6 months (around 12/27/2025) for Wellness.       [1]   Current Outpatient Medications   Medication Sig Dispense Refill    atorvastatin (LIPITOR) 10 MG tablet TAKE 1 TABLET(10 MG) BY MOUTH EVERY EVENING 90 tablet 3    donepeziL (ARICEPT) 10 MG tablet TAKE 1 TABLET(10 MG) BY MOUTH EVERY EVENING 90 tablet 3    DULoxetine (CYMBALTA) 60 MG capsule Take 1 capsule (60 mg total) by mouth once daily. 90 capsule 3    fluticasone propionate (FLONASE) 50 mcg/actuation nasal spray 2 sprays by Each Nostril route once daily.      fluticasone-salmeterol diskus inhaler 100-50 mcg Inhale 1 puff into the lungs 2 (two) times daily. Controller 60 each 3    GEMTESA 75 mg Tab Take 1 tablet by mouth Daily.      hydrocortisone 2.5 % cream Apply topically 2 (two) times daily. 28 g 1    loratadine (CLARITIN) 10 mg tablet Take 10 mg by mouth once daily.      mesalamine (ASACOL) 800 mg TbEC TAKE 1 TABLET(800 MG) BY MOUTH TWICE DAILY 180 tablet 3    metoprolol succinate (TOPROL-XL) 50 MG 24 hr tablet Take 50 mg by mouth Daily.      montelukast (SINGULAIR) 10 mg tablet TAKE 1  TABLET(10 MG) BY MOUTH EVERY DAY 90 tablet 3    MULTIVITAMIN ORAL Take 1 tablet by mouth Daily.      MYRBETRIQ 50 mg Tb24 Take 1 tablet by mouth Daily.      nystatin (MYCOSTATIN) cream Apply topically 2 (two) times daily. 60 g 1    UNABLE TO FIND Take 1 tablet by mouth 2 (two) times a day. [place]       No current facility-administered medications for this visit.